# Patient Record
Sex: MALE | Race: WHITE | URBAN - METROPOLITAN AREA
[De-identification: names, ages, dates, MRNs, and addresses within clinical notes are randomized per-mention and may not be internally consistent; named-entity substitution may affect disease eponyms.]

---

## 2020-04-06 ENCOUNTER — IMPORTED ENCOUNTER (OUTPATIENT)
Dept: URBAN - METROPOLITAN AREA CLINIC 50 | Facility: CLINIC | Age: 85
End: 2020-04-06

## 2020-04-13 ENCOUNTER — IMPORTED ENCOUNTER (OUTPATIENT)
Dept: URBAN - METROPOLITAN AREA CLINIC 50 | Facility: CLINIC | Age: 85
End: 2020-04-13

## 2020-04-24 ENCOUNTER — IMPORTED ENCOUNTER (OUTPATIENT)
Dept: URBAN - METROPOLITAN AREA CLINIC 50 | Facility: CLINIC | Age: 85
End: 2020-04-24

## 2020-04-29 ENCOUNTER — IMPORTED ENCOUNTER (OUTPATIENT)
Dept: URBAN - METROPOLITAN AREA CLINIC 50 | Facility: CLINIC | Age: 85
End: 2020-04-29

## 2020-05-04 ENCOUNTER — IMPORTED ENCOUNTER (OUTPATIENT)
Dept: URBAN - METROPOLITAN AREA CLINIC 50 | Facility: CLINIC | Age: 85
End: 2020-05-04

## 2020-05-04 NOTE — PATIENT DISCUSSION
"""Recommend Excisional biopsy of Lesion RLL 05/12/2020 at [facility].  Discussed with patient ROSIE

## 2020-05-20 ENCOUNTER — IMPORTED ENCOUNTER (OUTPATIENT)
Dept: URBAN - METROPOLITAN AREA CLINIC 50 | Facility: CLINIC | Age: 85
End: 2020-05-20

## 2020-06-10 ENCOUNTER — IMPORTED ENCOUNTER (OUTPATIENT)
Dept: URBAN - METROPOLITAN AREA CLINIC 50 | Facility: CLINIC | Age: 85
End: 2020-06-10

## 2020-08-10 ENCOUNTER — IMPORTED ENCOUNTER (OUTPATIENT)
Dept: URBAN - METROPOLITAN AREA CLINIC 50 | Facility: CLINIC | Age: 85
End: 2020-08-10

## 2020-08-10 NOTE — PATIENT DISCUSSION
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  D/w patient if he is ""

## 2020-09-24 ENCOUNTER — IMPORTED ENCOUNTER (OUTPATIENT)
Dept: URBAN - METROPOLITAN AREA CLINIC 50 | Facility: CLINIC | Age: 85
End: 2020-09-24

## 2020-09-28 ENCOUNTER — IMPORTED ENCOUNTER (OUTPATIENT)
Dept: URBAN - METROPOLITAN AREA CLINIC 50 | Facility: CLINIC | Age: 85
End: 2020-09-28

## 2020-09-30 ENCOUNTER — IMPORTED ENCOUNTER (OUTPATIENT)
Dept: URBAN - METROPOLITAN AREA CLINIC 50 | Facility: CLINIC | Age: 85
End: 2020-09-30

## 2020-10-01 ENCOUNTER — IMPORTED ENCOUNTER (OUTPATIENT)
Dept: URBAN - METROPOLITAN AREA CLINIC 50 | Facility: CLINIC | Age: 85
End: 2020-10-01

## 2020-10-07 ENCOUNTER — IMPORTED ENCOUNTER (OUTPATIENT)
Dept: URBAN - METROPOLITAN AREA CLINIC 50 | Facility: CLINIC | Age: 85
End: 2020-10-07

## 2021-04-17 ASSESSMENT — VISUAL ACUITY
OD_SC: 20/30
OD_SC: 20/50
OD_PH: 20/30
OD_SC: 20/40-2
OS_SC: 20/50+2
OS_SC: 20/40
OS_SC: 20/30
OD_SC: 20/40
OD_SC: 20/30
OD_SC: 20/40
OS_CC: J2
OS_PH: 20/30-1
OS_SC: 20/50
OD_CC: J2
OD_PH: 20/30
OS_SC: 20/40-2
OD_PH: 20/30-1
OS_PH: 20/40
OD_SC: 20/40
OS_SC: 20/30
OS_SC: 20/40
OD_PH: 20/30-1
OS_PH: 20/40-
OS_SC: 20/50-+
OS_PH: 20/30-1
OD_SC: 20/30-

## 2021-04-17 ASSESSMENT — TONOMETRY
OS_IOP_MMHG: 17
OS_IOP_MMHG: 15
OD_IOP_MMHG: 13
OS_IOP_MMHG: 16
OS_IOP_MMHG: 13
OD_IOP_MMHG: 15
OD_IOP_MMHG: 17
OD_IOP_MMHG: 16
OD_IOP_MMHG: 17
OS_IOP_MMHG: 16
OS_IOP_MMHG: 17
OS_IOP_MMHG: 14
OD_IOP_MMHG: 13

## 2021-05-13 NOTE — PATIENT DISCUSSION
Based on today's exam, diagnostic studies, and review of records, the DETERMINATION WAS MADE FOR A VITRECTOMY with REMOVAL OF DISLOCATED IOL, POSSIBLE MEMBRANE PEEL . Discussed benefits, alternatives, and risks of surgery including (but not limited to) cataract (if natural lens is still present), infection, bleeding, retinal detachment, optic neuropathy, loss of vision, blindness, and loss of eye. Patient was told the vision may not return to the same level as prior to development of the membrane but should improve as the anatomy returns to a more normal contour. No prediction of the level of visual improvement and/or the degree of distortion reduction can be given.

## 2021-06-04 NOTE — PATIENT DISCUSSION
Based on today's exam, diagnostic studies, and review of records, the DETERMINATION WAS MADE FOR A VITRECTOMY with REMOVAL OF DISLOCATED IOL . Discussed benefits, alternatives, and risks of surgery including (but not limited to) cataract (if natural lens is still present), infection, bleeding, retinal detachment, optic neuropathy, loss of vision, blindness, and loss of eye. Patient was told the vision may not return to the same level as prior to development of the membrane but should improve as the anatomy returns to a more normal contour. No prediction of the level of visual improvement and/or the degree of distortion reduction can be given.

## 2021-06-15 ENCOUNTER — PREPPED CHART (OUTPATIENT)
Dept: URBAN - METROPOLITAN AREA CLINIC 48 | Facility: CLINIC | Age: 86
End: 2021-06-15

## 2021-07-02 ENCOUNTER — CATARACT EVALUATION (OUTPATIENT)
Dept: URBAN - METROPOLITAN AREA CLINIC 48 | Facility: CLINIC | Age: 86
End: 2021-07-02

## 2021-07-02 DIAGNOSIS — H35.371: ICD-10-CM

## 2021-07-02 DIAGNOSIS — H52.13: ICD-10-CM

## 2021-07-02 DIAGNOSIS — E11.9: ICD-10-CM

## 2021-07-02 DIAGNOSIS — H25.13: ICD-10-CM

## 2021-07-02 DIAGNOSIS — H40.1421: ICD-10-CM

## 2021-07-02 DIAGNOSIS — H40.1121: ICD-10-CM

## 2021-07-02 DIAGNOSIS — C44.111: ICD-10-CM

## 2021-07-02 PROCEDURE — 92014 COMPRE OPH EXAM EST PT 1/>: CPT

## 2021-07-02 PROCEDURE — 92015 DETERMINE REFRACTIVE STATE: CPT

## 2021-07-02 PROCEDURE — 92134 CPTRZ OPH DX IMG PST SGM RTA: CPT

## 2021-07-02 ASSESSMENT — VISUAL ACUITY
OS_SC: J8
OD_SC: J8
OS_SC: 20/50-1
OD_PH: 20/40
OU_SC: J7
OD_SC: 20/60
OS_GLARE: <20/400
OD_GLARE: <20/400
OU_SC: 20/50+2

## 2021-07-02 ASSESSMENT — TONOMETRY
OS_IOP_MMHG: 13
OD_IOP_MMHG: 13

## 2021-07-06 ENCOUNTER — BIOMETRY (OUTPATIENT)
Dept: URBAN - METROPOLITAN AREA CLINIC 49 | Facility: CLINIC | Age: 86
End: 2021-07-06

## 2021-07-06 DIAGNOSIS — H25.13: ICD-10-CM

## 2021-07-06 PROCEDURE — TOPOIOL CORNEAL TOPOGRAPHY-PREMIUM IOL

## 2021-07-06 PROCEDURE — 92136 OPHTHALMIC BIOMETRY: CPT

## 2021-07-06 ASSESSMENT — KERATOMETRY
OD_AXISANGLE_DEGREES: 46
OS_K2POWER_DIOPTERS: 40.62
OS_AXISANGLE_DEGREES: 135
OD_K1POWER_DIOPTERS: 42.12
OS_K1POWER_DIOPTERS: 41.25
OS_AXISANGLE2_DEGREES: 045
OD_K2POWER_DIOPTERS: 41.25
OD_AXISANGLE2_DEGREES: 136

## 2021-07-09 ENCOUNTER — PRE OP - CE/IOL OD (OUTPATIENT)
Dept: URBAN - METROPOLITAN AREA CLINIC 48 | Facility: CLINIC | Age: 86
End: 2021-07-09

## 2021-07-09 DIAGNOSIS — H25.11: ICD-10-CM

## 2021-07-09 PROCEDURE — PREOP PRE OP VISIT

## 2021-07-09 ASSESSMENT — KERATOMETRY
OS_K2POWER_DIOPTERS: 40.62
OD_AXISANGLE2_DEGREES: 136
OS_AXISANGLE_DEGREES: 135
OD_K1POWER_DIOPTERS: 42.12
OS_K1POWER_DIOPTERS: 41.25
OD_K2POWER_DIOPTERS: 41.25
OS_AXISANGLE2_DEGREES: 045
OD_AXISANGLE_DEGREES: 46

## 2021-07-09 ASSESSMENT — TONOMETRY
OS_IOP_MMHG: 14
OD_IOP_MMHG: 14

## 2021-07-09 ASSESSMENT — VISUAL ACUITY
OD_SC: 20/60
OS_SC: 20/50

## 2021-07-09 NOTE — PATIENT DISCUSSION
Discussion on MF vs Mono Focal lenses. Discussed possible glare/halos while driving at night with MF lenses. Patient understands that no intra ocular lens is perfect and potential need for glasses/CL's despite IOL implanted. After long discussion patient elects to proceed with [place] [facility] [place].

## 2021-07-09 NOTE — PATIENT DISCUSSION
CATARACT SURGERY PLANNER - STANDARD IOL/NO FEMTO: Phacoemulsification with IOL: Eye: OD|DOS: 7/15/2021|Model: DIBOO|Power: 18. 0(ORA)|Target: PLANO|Visc: DUET|Omidria: YES|10% Phenylephrine: YES|Epi-shugarcaine: YES|Phaco Setting: DENSE|BSS+: NO|Trypan Blue: NO|CTR: NO|Olive Tip: NO|Atropine: NO|Pupilloplasty: +/-|Notes: PLAN: DIBOO W/FEMTO @ PLANO OU +ISTENT INJECT OS ONLY. HX. MILD POAG OS ONLY; FAINT ERM OD; MDF OU;PXE OS ONLY. DILATED PUPIL: 6MM. ***NIDDM***.

## 2021-07-15 ENCOUNTER — SURGERY/PROCEDURE (OUTPATIENT)
Dept: URBAN - METROPOLITAN AREA SURGERY 16 | Facility: SURGERY | Age: 86
End: 2021-07-15

## 2021-07-15 ENCOUNTER — SAME DAY PO (OUTPATIENT)
Dept: URBAN - METROPOLITAN AREA CLINIC 48 | Facility: CLINIC | Age: 86
End: 2021-07-15

## 2021-07-15 DIAGNOSIS — Z98.41: ICD-10-CM

## 2021-07-15 DIAGNOSIS — H25.11: ICD-10-CM

## 2021-07-15 DIAGNOSIS — H25.13: ICD-10-CM

## 2021-07-15 DIAGNOSIS — Z96.1: ICD-10-CM

## 2021-07-15 PROCEDURE — DIB00FEMTO EYHANCE MONOFOCAL IOL WITH FEMTO

## 2021-07-15 PROCEDURE — 99024 POSTOP FOLLOW-UP VISIT: CPT

## 2021-07-15 PROCEDURE — 66984 XCAPSL CTRC RMVL W/O ECP: CPT

## 2021-07-15 ASSESSMENT — KERATOMETRY
OD_AXISANGLE_DEGREES: 46
OD_AXISANGLE2_DEGREES: 136
OD_K2POWER_DIOPTERS: 41.25
OD_AXISANGLE_DEGREES: 46
OS_AXISANGLE_DEGREES: 135
OD_K1POWER_DIOPTERS: 42.12
OS_K1POWER_DIOPTERS: 41.25
OD_AXISANGLE2_DEGREES: 136
OS_AXISANGLE_DEGREES: 135
OD_K1POWER_DIOPTERS: 42.12
OD_K2POWER_DIOPTERS: 41.25
OS_K2POWER_DIOPTERS: 40.62
OS_K1POWER_DIOPTERS: 41.25
OS_AXISANGLE2_DEGREES: 045
OS_K2POWER_DIOPTERS: 40.62
OS_AXISANGLE2_DEGREES: 045

## 2021-07-15 ASSESSMENT — VISUAL ACUITY: OD_SC: 20/40-2

## 2021-07-15 ASSESSMENT — TONOMETRY: OD_IOP_MMHG: 22

## 2021-07-23 ENCOUNTER — PRE OP - CE/IOL OS / 1 WEEK PO OD (OUTPATIENT)
Dept: URBAN - METROPOLITAN AREA CLINIC 48 | Facility: CLINIC | Age: 86
End: 2021-07-23

## 2021-07-23 VITALS — DIASTOLIC BLOOD PRESSURE: 74 MMHG | HEIGHT: 60 IN

## 2021-07-23 DIAGNOSIS — H35.371: ICD-10-CM

## 2021-07-23 DIAGNOSIS — H25.12: ICD-10-CM

## 2021-07-23 DIAGNOSIS — H40.1121: ICD-10-CM

## 2021-07-23 PROCEDURE — 92136 - 2N OPHTHALMIC BIOMETRY BY PARTIAL COHERENCE INTERFEROMETRY WITH INTRAOCULAR LENS POWER CALCULATION

## 2021-07-23 PROCEDURE — PREOP PRE OP VISIT

## 2021-07-23 PROCEDURE — 92134 CPTRZ OPH DX IMG PST SGM RTA: CPT

## 2021-07-23 RX ORDER — SODIUM CHLORIDE 50 MG/G: OINTMENT OPHTHALMIC EVERY EVENING

## 2021-07-23 ASSESSMENT — VISUAL ACUITY
OD_SC: 20/40
OD_SC: J5
OD_SC: 20/30-1
OS_SC: 20/70-1

## 2021-07-23 ASSESSMENT — TONOMETRY
OS_IOP_MMHG: 14
OD_IOP_MMHG: 14

## 2021-07-23 ASSESSMENT — KERATOMETRY
OD_K1POWER_DIOPTERS: 42.12
OD_K2POWER_DIOPTERS: 41.25
OS_AXISANGLE2_DEGREES: 045
OD_AXISANGLE_DEGREES: 46
OS_K1POWER_DIOPTERS: 41.25
OS_AXISANGLE_DEGREES: 135
OD_AXISANGLE2_DEGREES: 136
OS_K2POWER_DIOPTERS: 40.62

## 2021-07-23 NOTE — PATIENT DISCUSSION
CATARACT SURGERY PLANNER - STANDARD IOL/+FEMTO/MIGS: Phacoemulsification with IOL: Eye: OS|DOS: 7/29/2021|Model: DIBOO|Power: 18.0 (ORA)|Target: PLANO|Femto: YES|Arcs: 40° @ 135° ; 40° @ 315°|MIGS: YES iStent Inject|Visc: DUET|Omidria: YES|10% Phenylephrine: YES|Epi-shugarcaine: YES|Phaco Setting: DENSE|BSS+: NO|Trypan Blue: NO|CTR: +/-|Olive Tip: NO|Atropine: NO|Pupilloplasty: NO|Notes: Plan: DIBOO w/Femto Target [place] OU; iStent Inject OS. Hx: Mild POAG OS; PXE OS; Faint ERM OD; MDF OU. DILATES to 6mm.

## 2021-07-29 ENCOUNTER — SURGERY/PROCEDURE (OUTPATIENT)
Dept: URBAN - METROPOLITAN AREA SURGERY 16 | Facility: SURGERY | Age: 86
End: 2021-07-29

## 2021-07-29 ENCOUNTER — SAME DAY PO (OUTPATIENT)
Dept: URBAN - METROPOLITAN AREA CLINIC 48 | Facility: CLINIC | Age: 86
End: 2021-07-29

## 2021-07-29 DIAGNOSIS — H40.1121: ICD-10-CM

## 2021-07-29 DIAGNOSIS — Z98.42: ICD-10-CM

## 2021-07-29 DIAGNOSIS — Z96.1: ICD-10-CM

## 2021-07-29 DIAGNOSIS — H25.12: ICD-10-CM

## 2021-07-29 DIAGNOSIS — H40.042: ICD-10-CM

## 2021-07-29 PROCEDURE — DIB00FEMTO EYHANCE MONOFOCAL IOL WITH FEMTO

## 2021-07-29 PROCEDURE — 0191T ISTENT INJECT MICROSTENT: CPT

## 2021-07-29 PROCEDURE — 66984 XCAPSL CTRC RMVL W/O ECP: CPT

## 2021-07-29 PROCEDURE — 99024 POSTOP FOLLOW-UP VISIT: CPT

## 2021-07-29 ASSESSMENT — KERATOMETRY
OS_K2POWER_DIOPTERS: 40.62
OS_K2POWER_DIOPTERS: 40.62
OS_AXISANGLE_DEGREES: 135
OD_K2POWER_DIOPTERS: 41.25
OS_K1POWER_DIOPTERS: 41.25
OD_K2POWER_DIOPTERS: 41.25
OD_AXISANGLE_DEGREES: 46
OS_AXISANGLE2_DEGREES: 045
OS_K1POWER_DIOPTERS: 41.25
OD_AXISANGLE2_DEGREES: 136
OD_K1POWER_DIOPTERS: 42.12
OS_AXISANGLE2_DEGREES: 045
OS_AXISANGLE_DEGREES: 135
OD_K1POWER_DIOPTERS: 42.12
OD_AXISANGLE_DEGREES: 46
OD_AXISANGLE2_DEGREES: 136

## 2021-07-29 ASSESSMENT — TONOMETRY
OS_IOP_MMHG: 36
OS_IOP_MMHG: 6

## 2021-07-29 ASSESSMENT — VISUAL ACUITY: OS_SC: 20/100

## 2021-07-29 NOTE — PATIENT DISCUSSION
CATARACT SURGERY PLANNER - STANDARD IOL/+FEMTO/MIGS: Phacoemulsification with IOL: Eye: OS|DOS: 7/29/2021|Model: DIBOO|Power: 18.0 (ORA)|Target: PLANO|Femto: YES|Arcs: 40° @ 135° ; 40° @ 315°|MIGS: YES iStent Inject|Visc: DUET|Omidria: YES|10% Phenylephrine: YES|Epi-shugarcaine: YES|Phaco Setting: DENSE|BSS+: NO|Trypan Blue: NO|CTR: +/-|Olive Tip: NO|Atropine: NO|Pupilloplasty: NO|Notes: Plan: DIBOO w/Femto Target [place] OU; iStent Inject OS. Hx: Mild POAG OS; PXE OS; Faint ERM OD; MDF OU. DILATES to 6mm.

## 2021-07-29 NOTE — PATIENT DISCUSSION
CATARACT SURGERY PLANNER - STANDARD IOL/+FEMTO/MIGS: Phacoemulsification with IOL: Eye: OS|DOS: 7/29/2021|Model: DIBOO|Power: 18.0 (ORA)|Target: PLANO|Femto: YES|Arcs: 40° @ 135° ; 40° @ 315°|MIGS: YES iStent Inject|Visc: DUET|Omidria: YES|10% Phenylephrine: YES|Epi-shugarcaine: YES|Phaco Setting: DENSE|BSS+: NO|Trypan Blue: NO|CTR: +/-|Olive Tip: NO|Atropine: NO|Pupilloplasty: NO|Notes: Plan: DIBOO w/Femto Target [place] OU; iStent Inject OS. Hx: Mild POAG OS; PXE OS; Faint ERM OD; MDF OU. DILATES to 6mm.

## 2021-08-13 ENCOUNTER — 1 WEEK PO - CE/IOL (OUTPATIENT)
Dept: URBAN - METROPOLITAN AREA CLINIC 49 | Facility: CLINIC | Age: 86
End: 2021-08-13

## 2021-08-13 DIAGNOSIS — H35.371: ICD-10-CM

## 2021-08-13 DIAGNOSIS — Z98.42: ICD-10-CM

## 2021-08-13 PROCEDURE — 92134 CPTRZ OPH DX IMG PST SGM RTA: CPT

## 2021-08-13 PROCEDURE — 99024 POSTOP FOLLOW-UP VISIT: CPT

## 2021-08-13 ASSESSMENT — KERATOMETRY
OD_AXISANGLE_DEGREES: 46
OS_K1POWER_DIOPTERS: 41.25
OS_AXISANGLE2_DEGREES: 045
OS_AXISANGLE_DEGREES: 135
OS_K2POWER_DIOPTERS: 40.62
OD_AXISANGLE2_DEGREES: 136
OD_K2POWER_DIOPTERS: 41.25
OD_K1POWER_DIOPTERS: 42.12

## 2021-08-13 ASSESSMENT — TONOMETRY
OD_IOP_MMHG: 14
OS_IOP_MMHG: 16

## 2021-08-13 ASSESSMENT — VISUAL ACUITY
OS_PH: 20/40-2
OS_SC: 20/60
OS_SC: J16
OD_SC: 20/30-2

## 2021-08-13 NOTE — PATIENT DISCUSSION
CATARACT SURGERY PLANNER - STANDARD IOL/+FEMTO/MIGS: Phacoemulsification with IOL: Eye: OS|DOS: 7/29/2021|Model: DIBOO|Power: 18.0 (ORA)|Target: PLANO|Femto: YES|Arcs: 40° @ 135° ; 40° @ 315°|MIGS: YES iStent Inject|Visc: DUET|Omidria: YES|10% Phenylephrine: YES|Epi-shugarcaine: YES|Phaco Setting: DENSE|BSS+: NO|Trypan Blue: NO|CTR: +/-|Olive Tip: NO|Atropine: NO|Pupilloplasty: NO|Notes: Plan: DIBOO w/Femto Target [place] OU; iStent Inject OS. Hx: Mild POAG OS; PXE OS; Faint ERM OD; MDF OU. DILATES to 6mm.

## 2021-08-30 ENCOUNTER — 4 WEEK PO - CE/IOL (OUTPATIENT)
Dept: URBAN - METROPOLITAN AREA CLINIC 48 | Facility: CLINIC | Age: 86
End: 2021-08-30

## 2021-08-30 DIAGNOSIS — Z96.1: ICD-10-CM

## 2021-08-30 DIAGNOSIS — Z98.42: ICD-10-CM

## 2021-08-30 DIAGNOSIS — H52.13: ICD-10-CM

## 2021-08-30 DIAGNOSIS — Z98.41: ICD-10-CM

## 2021-08-30 DIAGNOSIS — H35.371: ICD-10-CM

## 2021-08-30 DIAGNOSIS — H02.052: ICD-10-CM

## 2021-08-30 DIAGNOSIS — H04.123: ICD-10-CM

## 2021-08-30 PROCEDURE — 99024 POSTOP FOLLOW-UP VISIT: CPT

## 2021-08-30 PROCEDURE — 67820 REVISE EYELASHES: CPT

## 2021-08-30 PROCEDURE — 92134 CPTRZ OPH DX IMG PST SGM RTA: CPT

## 2021-08-30 PROCEDURE — 92015NC REFRACTION NO CHARGE

## 2021-08-30 ASSESSMENT — KERATOMETRY
OS_AXISANGLE_DEGREES: 135
OD_K2POWER_DIOPTERS: 41.25
OS_K1POWER_DIOPTERS: 41.25
OD_K1POWER_DIOPTERS: 42.12
OS_AXISANGLE2_DEGREES: 045
OS_K2POWER_DIOPTERS: 40.62
OD_AXISANGLE_DEGREES: 46
OD_AXISANGLE2_DEGREES: 136

## 2021-08-30 ASSESSMENT — TONOMETRY
OS_IOP_MMHG: 15
OD_IOP_MMHG: 14

## 2021-08-30 ASSESSMENT — VISUAL ACUITY
OD_SC: 20/30+1
OU_SC: 20/30-1
OS_SC: 20/40-1

## 2021-08-30 NOTE — PROCEDURE NOTE: CLINICAL
PROCEDURE NOTE: Epilation #4 Right Lower Lid. Diagnosis: Trichiasis without Entropion. Consent was obtained prior to the procedure. Patient was brought to slit lamp where trichiasis was/were removed using micro forceps. Patient tolerated procedure well. Heather Steven

## 2022-04-22 NOTE — PATIENT DISCUSSION
Advised patient to call office immediately if worsening of vision or pain. Inflammation Suggestive Of Cancer Camouflage Histology Text: There was a dense lymphocytic infiltrate which prevented adequate histologic evaluation of adjacent structures.